# Patient Record
Sex: MALE
[De-identification: names, ages, dates, MRNs, and addresses within clinical notes are randomized per-mention and may not be internally consistent; named-entity substitution may affect disease eponyms.]

---

## 2017-01-25 ENCOUNTER — CHARTING TRANS (OUTPATIENT)
Dept: OTHER | Age: 32
End: 2017-01-25

## 2017-01-25 ENCOUNTER — LAB SERVICES (OUTPATIENT)
Dept: OTHER | Age: 32
End: 2017-01-25

## 2017-01-25 LAB — RAPID STREP GROUP A: NORMAL

## 2017-11-06 ENCOUNTER — OFFICE VISIT (OUTPATIENT)
Dept: FAMILY MEDICINE CLINIC | Facility: CLINIC | Age: 32
End: 2017-11-06

## 2017-11-06 VITALS
WEIGHT: 210 LBS | DIASTOLIC BLOOD PRESSURE: 70 MMHG | RESPIRATION RATE: 20 BRPM | TEMPERATURE: 98 F | SYSTOLIC BLOOD PRESSURE: 122 MMHG | OXYGEN SATURATION: 98 % | HEART RATE: 70 BPM | BODY MASS INDEX: 31.83 KG/M2 | HEIGHT: 68 IN

## 2017-11-06 DIAGNOSIS — J02.9 SORE THROAT: Primary | ICD-10-CM

## 2017-11-06 DIAGNOSIS — R09.82 POST-NASAL DRIP: ICD-10-CM

## 2017-11-06 DIAGNOSIS — H69.83 DYSFUNCTION OF BOTH EUSTACHIAN TUBES: ICD-10-CM

## 2017-11-06 PROCEDURE — 99203 OFFICE O/P NEW LOW 30 MIN: CPT | Performed by: NURSE PRACTITIONER

## 2017-11-06 RX ORDER — AMOXICILLIN 875 MG/1
875 TABLET, COATED ORAL 2 TIMES DAILY
Qty: 20 TABLET | Refills: 0 | Status: SHIPPED | OUTPATIENT
Start: 2017-11-06 | End: 2017-11-16

## 2017-11-07 NOTE — PROGRESS NOTES
HPI:   Polly Dennis is a 27year old male who presents with ill symptoms for  1  weeks. Patient reports sore throat, congestion, headache, mild cough. . Has tried cold and cough medication with some relief.  Had improved yesterday but worse again this mo daily.    Post-nasal drip  -     amoxicillin 875 MG Oral Tab; Take 1 tablet (875 mg total) by mouth 2 (two) times daily. Dysfunction of both eustachian tubes  -     amoxicillin 875 MG Oral Tab; Take 1 tablet (875 mg total) by mouth 2 (two) times daily.

## 2017-11-07 NOTE — PATIENT INSTRUCTIONS
·  PLAN: Amoxicillin, take as directed. Finish all the medication even if you feel better. · Probiotics or yogurt daily during antibiotic use will help decrease stomach upset and restore good bacteria to the gut. · Salt water gargles (1 tsp.  Salt in 6 o

## 2018-11-05 VITALS
HEIGHT: 68 IN | RESPIRATION RATE: 16 BRPM | TEMPERATURE: 97.8 F | BODY MASS INDEX: 30.92 KG/M2 | HEART RATE: 74 BPM | SYSTOLIC BLOOD PRESSURE: 128 MMHG | WEIGHT: 203.99 LBS | DIASTOLIC BLOOD PRESSURE: 80 MMHG

## 2021-10-25 PROBLEM — F41.9 ANXIETY DISORDER: Status: ACTIVE | Noted: 2021-10-25

## 2021-10-25 PROBLEM — F39 MOOD DISORDER (HCC): Status: ACTIVE | Noted: 2021-10-25

## (undated) NOTE — LETTER
Date: 11/6/2017    Patient Name: Tereza Lopez          To Whom it may concern: The above patient was seen at the [facility] for treatment of a medical condition. This patient should be excused from attending work through 11/6/17.